# Patient Record
Sex: FEMALE | Race: WHITE | NOT HISPANIC OR LATINO | URBAN - NONMETROPOLITAN AREA
[De-identification: names, ages, dates, MRNs, and addresses within clinical notes are randomized per-mention and may not be internally consistent; named-entity substitution may affect disease eponyms.]

---

## 2016-09-27 PROBLEM — H52.4: Noted: 2019-01-04

## 2016-09-27 PROBLEM — E11.3593: Noted: 2019-01-04

## 2016-09-27 PROBLEM — H35.373: Noted: 2019-01-04

## 2019-01-04 ENCOUNTER — IMPORTED ENCOUNTER (OUTPATIENT)
Dept: URBAN - NONMETROPOLITAN AREA CLINIC 1 | Facility: CLINIC | Age: 59
End: 2019-01-04

## 2019-01-04 PROCEDURE — 92014 COMPRE OPH EXAM EST PT 1/>: CPT

## 2019-01-04 PROCEDURE — 92015 DETERMINE REFRACTIVE STATE: CPT

## 2019-01-04 NOTE — PATIENT DISCUSSION
Presbyopia OUDiscussed refractive status in detail with patient. MR done today but hold off on updating glasses due to PCO OSContinue to monitor. Pseudophakia OUDiscussed diagnosis in detail with patient. Both intraocular implants in place and stable. Moderate PCO OS notedRecommend refer to Dr. Waqas Ch for further evaluation and treatment. Patient agrees with planHx of PDR OU S/P PRP OUDiscusse diagnosis in detail with patient. Discussed diagnosis with patient. Discussed the risk of diabetic damage of the retina with potential vision loss and the importance of routine follow-up. Emphasized strict blood sugar control. Recommend refer to Dr. Waqas Ch for further evaluation and treatment.  Patient agrees with planERM OUDiscussed diagnosis in detail w/ pt todayWill monitor for changes.; 's Notes: MR  1/4/19DFE  1/4/19

## 2019-02-01 ENCOUNTER — IMPORTED ENCOUNTER (OUTPATIENT)
Dept: URBAN - NONMETROPOLITAN AREA CLINIC 1 | Facility: CLINIC | Age: 59
End: 2019-02-01

## 2019-02-01 PROBLEM — H52.4: Noted: 2019-02-01

## 2019-02-01 PROBLEM — Z96.1: Noted: 2019-02-01

## 2019-02-01 PROBLEM — E11.3553: Noted: 2019-02-01

## 2019-02-01 PROBLEM — H35.373: Noted: 2019-02-01

## 2019-02-01 PROBLEM — H26.492: Noted: 2019-02-01

## 2019-02-01 PROCEDURE — 92014 COMPRE OPH EXAM EST PT 1/>: CPT

## 2019-02-01 NOTE — PATIENT DISCUSSION
Type II DM stable Proliferative without edema OU:  With A1C of 5 patient has been in good condition for quite some time now. Hx of PDR OU S/P PRP OU scarring noted on exam todayDiscussed findings w/ pt todayDiscussed the risk of diabetic damage of the retina with potential vision loss and the importance of routine follow-up. Emphasized strict blood sugar control and how it can affect the eyes. No retinopathy or other manifestations seen today related to the DM. Recommend yearly evaluation with me with Optos. Patient agrees with planPseudophakia OUDiscussed diagnosis in detail with patient. Both intraocular implants in place and stable. Moderate PCO OS noted but not quite yet visually signifiant and no real glare complaint.  Continue to monitor for now Patient agrees with planERM OUDiscussed diagnosis in detail w/ pt todayWill monitor for changes RTC 6 months w/ Hilda and OCT mac; 's Notes: MR  1/4/19DFE  1/4/19

## 2019-03-14 NOTE — PATIENT DISCUSSION
New Prescription: olopatadine (olopatadine): drops: 0.2% 1 drop twice a day as directed into both eyes 03-

## 2019-05-16 ENCOUNTER — IMPORTED ENCOUNTER (OUTPATIENT)
Dept: URBAN - NONMETROPOLITAN AREA CLINIC 1 | Facility: CLINIC | Age: 59
End: 2019-05-16

## 2019-05-16 PROBLEM — H26.492: Noted: 2019-02-01

## 2019-05-16 PROBLEM — H52.4: Noted: 2019-02-01

## 2019-05-16 PROBLEM — H35.373: Noted: 2019-02-01

## 2019-05-16 PROBLEM — E11.3592: Noted: 2019-05-16

## 2019-05-16 PROBLEM — Z96.1: Noted: 2019-02-01

## 2019-05-16 PROCEDURE — 92250 FUNDUS PHOTOGRAPHY W/I&R: CPT

## 2019-05-16 PROCEDURE — 99213 OFFICE O/P EST LOW 20 MIN: CPT

## 2019-05-16 NOTE — PATIENT DISCUSSION
Type II DM  Proliferative without edema OU:  - Discussed diagnosis in detail with patient - Discussed signs and symptoms of progression - Stressed good blood sugar control - Recommend no sodas - Recommend sleeping up right - History of   OU scarring noted on exam today- Scattered MA's through out posterior pole OU- Patient has been seen and treated by Dr. Davida Bence previously. - Optos done today shows OD moderate NPDR and OS shows PDR with vitreous heme. Recommend patient seeing [facility]. Patient agrees with plan- Continue to monitor  Pseudophakia OU- Discussed diagnosis in detail with patient. - Both intraocular implants in place and stable. - Moderate PCO OS noted but not quite yet visually signifiant  - Continue to monitorERM OU- Discussed diagnosis in detail w/ pt today- Will monitor for changes; 's Notes: MR  1/4/19DFE  1/4/19Optos 5/16/19

## 2019-11-04 NOTE — PATIENT DISCUSSION
New Prescription: erythromycin (erythromycin): ointment: 5 mg/gram (0.5 %) a small amount at bedtime on eyelid 11-

## 2019-11-04 NOTE — PATIENT DISCUSSION
New Prescription: TobraDex (tobramycin-dexamethasone): drops,suspension: 0.3-0.1% 1 drop four times a day into affected eye 11-

## 2019-11-13 NOTE — PATIENT DISCUSSION
Continue: TobraDex (tobramycin-dexamethasone): drops,suspension: 0.3-0.1% 1 drop four times a day into affected eye 11-

## 2019-11-13 NOTE — PATIENT DISCUSSION
Continue: erythromycin (erythromycin): ointment: 5 mg/gram (0.5 %) a small amount at bedtime on eyelid 11-

## 2020-04-14 NOTE — PATIENT DISCUSSION
Continue: erythromycin (erythromycin): ointment: 5 mg/gram (0.5 %) a small amount at bedtime as directed on eyelid 04-

## 2020-04-16 NOTE — PATIENT DISCUSSION
Stopped Today: TobraDex (tobramycin-dexamethasone): drops,suspension: 0.3-0.1% 1 drop four times a day into affected eye 11-

## 2020-04-16 NOTE — PATIENT DISCUSSION
DRY EYE WITH INFLAMMATION:  PRESERVATIVE FREE ARTIFICIAL TEARS Q2 HOURS, LID HYGIENE. PRESCRIBED LOTEMAX TID. CONSIDER RESTASIS  OR PUNCTAL PLUGS AND/OR LIPIFLOW AT FOLLOW. RETURN SOONER IF SYMPTOMS WORSEN.

## 2020-04-28 NOTE — PATIENT DISCUSSION
Continue: fluorometholone (fluorometholone): drops,suspension: 0.1% 1 drop three times a day as directed into both eyes 04-

## 2020-04-28 NOTE — PATIENT DISCUSSION
DRY EYE WITH INFLAMMATION:  PRESERVATIVE FREE ARTIFICIAL TEARS Q2 HOURS, LID HYGIENE. CONTINUE FML TID TODAY, BID- 1 WEEK THEN QD UNTIL NEXT APPOINTMENT. EMYCIN YENNY QHS. CONSIDER RESTASIS  OR PUNCTAL PLUGS AND/OR LIPIFLOW AT FOLLOW. RETURN SOONER IF SYMPTOMS WORSEN.

## 2020-07-16 NOTE — PATIENT DISCUSSION
DIABETES WITHOUT RETINOPATHY OU: NO RETINOPATHY TODAY.  RETURN FOR FOLLOW-UP AS SCHEDULED FOR DILATED EYE EXAM

## 2020-10-27 NOTE — PATIENT DISCUSSION
OH, OU- MOST LIKELY DUE TO MGD. TREAT MGD FIRST THEN CONSIDER OTHER TREATMENT OPTIONS IF SYMPTOMS PERSIST.

## 2020-10-27 NOTE — PATIENT DISCUSSION
Dry Eye Syndrome ([place]) Counseling: Dry Eye Syndrome, also known as Keratoconjunctivitis [place]ca, is a common condition that occurs when your tears are not able to provide adequate lubrication for your eyes. Symptoms can be exacerbated by environmental factors such as smoke, wind, or prolonged eye use. Treatment options include, but are not limited to, artificial tears, punctal plugs, Restasis, oral omega-3 supplements, and lubricating ointments. I have explained to the patient that successful management is dependent on patient compliance with treatment as prescribed and/or the treatment regimen.

## 2021-11-24 ENCOUNTER — IMPORTED ENCOUNTER (OUTPATIENT)
Dept: URBAN - NONMETROPOLITAN AREA CLINIC 1 | Facility: CLINIC | Age: 61
End: 2021-11-24

## 2021-11-24 PROCEDURE — 92250 FUNDUS PHOTOGRAPHY W/I&R: CPT

## 2021-11-24 PROCEDURE — 99214 OFFICE O/P EST MOD 30 MIN: CPT

## 2021-11-24 NOTE — PATIENT DISCUSSION
Type II DM  Proliferative without edema OU:  - Discussed diagnosis in detail with patient - Discussed signs and symptoms of progression - Stressed good blood sugar control - Recommend no sodas - Recommend sleeping up right - History of   OU scarring noted on exam today- Patient has been seen and treated by Dr. Davida Bence previously. - Optos done today shows OU shows PRP and focal scarring 360 along with dot blots posterior pole. OS shows a sub retinal hemorrhage  - Continue following up with Dr. Davida Bence - Continue to monitor  Pseudophakia OU- Discussed diagnosis in detail with patient. - Moderate PCO OS noted but not quite yet visually signifiant  - Continue to monitorERM OU- Discussed diagnosis in detail w/ pt today- Will monitor for changes; Dr's Notes: MR  1/4/19DFE  1/4/19Optos 5/16/19

## 2022-02-22 NOTE — PROCEDURE NOTE: CLINICAL
PROCEDURE NOTE: Excision of Eyelid Lesion OS. Diagnosis: Other Benign Neoplasm of Skin of Eyelid, Including [place]. Anesthesia: Topical. Prep: Betadine Flush. Prior to treatment, the risks/benefits/alternatives were discussed. The patient wished to proceed with procedure. Local anesthetic was given. The eyelid was prepped and draped for procedure. The lesion was incised and removed. The wound was repaired with sutures. Patient tolerated procedure well. There were no complications. Post procedure instructions given. Azalia Piña

## 2022-02-22 NOTE — PATIENT DISCUSSION
The patient has presented with one or more eyelid or facial lesions with growth, change, irritation or abnormal appearance. A biopsy with pathology is recommended for definitive diagnosis. The patient has been given post-procedure written instructions. The patient understands that the results of the pathology report will be available after one week to two weeks. The patient understands that an attempt to call and inform them of the result will be made within 2 weeks. The patient was instructed to call our office if they do not receive a call from us after two weeks.

## 2022-04-09 ASSESSMENT — VISUAL ACUITY
OD_CC: 20/29-2
OD_CC: 20/25-1
OD_CC: 20/30
OS_CC: 20/50
OS_GLARE: 20/50
OS_PH: 20/25-1
OS_CC: 20/30-
OS_CC: 20/30-2 BLURRY
OU_CC: 20/25
OD_CC: 20/25-2
OS_CC: 20/40-2
OS_PH: 20/25-1

## 2022-04-09 ASSESSMENT — TONOMETRY
OS_IOP_MMHG: 16
OS_IOP_MMHG: 16
OD_IOP_MMHG: 16
OS_IOP_MMHG: 17
OD_IOP_MMHG: 16
OS_IOP_MMHG: 16

## 2022-08-15 ENCOUNTER — EMERGENCY VISIT (OUTPATIENT)
Dept: URBAN - NONMETROPOLITAN AREA CLINIC 1 | Facility: CLINIC | Age: 62
End: 2022-08-15

## 2022-08-15 DIAGNOSIS — E11.3292: ICD-10-CM

## 2022-08-15 DIAGNOSIS — H26.492: ICD-10-CM

## 2022-08-15 DIAGNOSIS — E11.3211: ICD-10-CM

## 2022-08-15 PROCEDURE — 92134 CPTRZ OPH DX IMG PST SGM RTA: CPT

## 2022-08-15 PROCEDURE — 99214 OFFICE O/P EST MOD 30 MIN: CPT

## 2022-08-15 ASSESSMENT — VISUAL ACUITY
OS_CC: 20/60
OS_PH: 20/50
OD_CC: 20/25-1

## 2022-08-15 ASSESSMENT — TONOMETRY
OD_IOP_MMHG: 15
OS_IOP_MMHG: 15

## 2022-08-15 NOTE — PATIENT DISCUSSION
Discussed diagnosis in detail with patient. Patient has noticed decreased vision. Thick 3+ PCO noted today OS looks like cortical material central. Recommend YAG PC OS with Dr. Tammie Sabillon, patient agrees with plan.

## 2022-08-15 NOTE — PATIENT DISCUSSION
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 Stressed the importance of strong glucose control. Advised the importance of at least yearly dilated examinations, but to contact us immediately for any problems or concerns. Patient is being followed by [facility] and has had injections in OS  previously. Would like to get records.

## 2022-11-09 ENCOUNTER — CONSULTATION/EVALUATION (OUTPATIENT)
Dept: URBAN - NONMETROPOLITAN AREA CLINIC 1 | Facility: CLINIC | Age: 62
End: 2022-11-09

## 2022-11-09 DIAGNOSIS — H26.492: ICD-10-CM

## 2022-11-09 PROCEDURE — 99214 OFFICE O/P EST MOD 30 MIN: CPT

## 2022-11-09 PROCEDURE — 66821 AFTER CATARACT LASER SURGERY: CPT

## 2022-11-09 ASSESSMENT — VISUAL ACUITY
OD_CC: 20/25
OS_CC: 20/70
OS_PH: 20/60

## 2022-11-09 ASSESSMENT — TONOMETRY
OD_IOP_MMHG: 15
OS_IOP_MMHG: 15

## 2022-11-09 NOTE — PROCEDURE NOTE: CLINICAL
PROCEDURE NOTE: YAG Capsulotomy OS. Anesthesia: Topical. The purpose and nature of the procedure, possible alternative methods of treatment, the risks involved and the possibility of complications were discussed with patient. The Patient wishes to proceed and the consent was signed. 1 gtt Prolensa applied. The laser was then performed under topical anesthesia with no complications. Post op instructions were given to patient as well as a follow-up appointment. Patient was advised to call our office if any questions or concerns. 2.0 MJ 14 shots.

## 2024-06-05 ENCOUNTER — FOLLOW UP (OUTPATIENT)
Dept: URBAN - NONMETROPOLITAN AREA CLINIC 1 | Facility: CLINIC | Age: 64
End: 2024-06-05

## 2024-06-05 DIAGNOSIS — E11.3292: ICD-10-CM

## 2024-06-05 DIAGNOSIS — H00.11: ICD-10-CM

## 2024-06-05 DIAGNOSIS — Z79.4: ICD-10-CM

## 2024-06-05 DIAGNOSIS — E11.3211: ICD-10-CM

## 2024-06-05 DIAGNOSIS — H26.492: ICD-10-CM

## 2024-06-05 PROCEDURE — 99213 OFFICE O/P EST LOW 20 MIN: CPT

## 2024-06-05 PROCEDURE — 92134 CPTRZ OPH DX IMG PST SGM RTA: CPT

## 2024-06-05 ASSESSMENT — VISUAL ACUITY
OS_PH: 20/40
OS_SC: 20/60-1
OU_SC: 20/40-1
OS_CC: 20/70+2
OU_CC: 20/40-1
OD_SC: 20/30
OD_CC: 20/40-1

## 2024-06-05 ASSESSMENT — TONOMETRY
OD_IOP_MMHG: 18
OS_IOP_MMHG: 18

## 2024-07-01 ENCOUNTER — CONSULTATION/EVALUATION (OUTPATIENT)
Dept: RURAL CLINIC 3 | Facility: CLINIC | Age: 64
End: 2024-07-01

## 2024-07-01 DIAGNOSIS — H00.11: ICD-10-CM

## 2024-07-01 PROCEDURE — 99214 OFFICE O/P EST MOD 30 MIN: CPT

## 2024-07-01 ASSESSMENT — VISUAL ACUITY
OS_BAT: 20/100
OD_CC: 20/40

## 2024-07-01 ASSESSMENT — TONOMETRY
OD_IOP_MMHG: 18
OS_IOP_MMHG: 18

## 2024-10-30 ENCOUNTER — CLINIC PROCEDURE ONLY (OUTPATIENT)
Dept: URBAN - NONMETROPOLITAN AREA CLINIC 1 | Facility: CLINIC | Age: 64
End: 2024-10-30

## 2024-11-07 ENCOUNTER — POST-OP (OUTPATIENT)
Dept: URBAN - NONMETROPOLITAN AREA CLINIC 1 | Facility: CLINIC | Age: 64
End: 2024-11-07

## 2024-11-07 DIAGNOSIS — Z98.890: ICD-10-CM

## 2024-11-07 PROCEDURE — 99024 POSTOP FOLLOW-UP VISIT: CPT
